# Patient Record
Sex: FEMALE | Race: ASIAN | NOT HISPANIC OR LATINO
[De-identification: names, ages, dates, MRNs, and addresses within clinical notes are randomized per-mention and may not be internally consistent; named-entity substitution may affect disease eponyms.]

---

## 2018-01-08 ENCOUNTER — APPOINTMENT (OUTPATIENT)
Dept: ENDOCRINOLOGY | Facility: CLINIC | Age: 73
End: 2018-01-08

## 2020-01-06 DIAGNOSIS — Z87.442 PERSONAL HISTORY OF URINARY CALCULI: ICD-10-CM

## 2020-01-21 ENCOUNTER — FORM ENCOUNTER (OUTPATIENT)
Age: 75
End: 2020-01-21

## 2020-01-22 ENCOUNTER — OUTPATIENT (OUTPATIENT)
Dept: OUTPATIENT SERVICES | Facility: HOSPITAL | Age: 75
LOS: 1 days | End: 2020-01-22

## 2020-01-22 ENCOUNTER — APPOINTMENT (OUTPATIENT)
Dept: ULTRASOUND IMAGING | Facility: CLINIC | Age: 75
End: 2020-01-22
Payer: MEDICARE

## 2020-01-22 DIAGNOSIS — Z98.89 OTHER SPECIFIED POSTPROCEDURAL STATES: Chronic | ICD-10-CM

## 2020-01-22 DIAGNOSIS — Z41.9 ENCOUNTER FOR PROCEDURE FOR PURPOSES OTHER THAN REMEDYING HEALTH STATE, UNSPECIFIED: Chronic | ICD-10-CM

## 2020-01-22 PROCEDURE — 76770 US EXAM ABDO BACK WALL COMP: CPT | Mod: 26

## 2021-04-27 ENCOUNTER — APPOINTMENT (OUTPATIENT)
Dept: UROLOGY | Facility: CLINIC | Age: 76
End: 2021-04-27

## 2024-04-23 ENCOUNTER — APPOINTMENT (OUTPATIENT)
Dept: UROLOGY | Facility: CLINIC | Age: 79
End: 2024-04-23
Payer: MEDICARE

## 2024-04-23 VITALS
TEMPERATURE: 97.5 F | WEIGHT: 146 LBS | OXYGEN SATURATION: 99 % | SYSTOLIC BLOOD PRESSURE: 122 MMHG | HEIGHT: 62 IN | HEART RATE: 67 BPM | BODY MASS INDEX: 26.87 KG/M2 | DIASTOLIC BLOOD PRESSURE: 80 MMHG

## 2024-04-23 DIAGNOSIS — R31.29 OTHER MICROSCOPIC HEMATURIA: ICD-10-CM

## 2024-04-23 PROCEDURE — 99204 OFFICE O/P NEW MOD 45 MIN: CPT

## 2024-04-24 DIAGNOSIS — N39.0 URINARY TRACT INFECTION, SITE NOT SPECIFIED: ICD-10-CM

## 2024-04-24 LAB
APPEARANCE: CLEAR
BACTERIA: NEGATIVE /HPF
BILIRUBIN URINE: NEGATIVE
BLOOD URINE: NEGATIVE
CAST: 1 /LPF
COLOR: YELLOW
EPITHELIAL CELLS: 2 /HPF
GLUCOSE QUALITATIVE U: NEGATIVE MG/DL
HYALINE CASTS: PRESENT
KETONES URINE: NEGATIVE MG/DL
LEUKOCYTE ESTERASE URINE: ABNORMAL
MICROSCOPIC-UA: NORMAL
MUCUS: PRESENT
NITRITE URINE: NEGATIVE
PH URINE: 5.5
PROTEIN URINE: NORMAL MG/DL
RED BLOOD CELLS URINE: 6 /HPF
SPECIFIC GRAVITY URINE: 1.02
UROBILINOGEN URINE: 1 MG/DL
WHITE BLOOD CELLS URINE: 3 /HPF

## 2024-04-24 RX ORDER — NITROFURANTOIN (MONOHYDRATE/MACROCRYSTALS) 25; 75 MG/1; MG/1
100 CAPSULE ORAL
Qty: 10 | Refills: 0 | Status: ACTIVE | COMMUNITY
Start: 2024-04-24 | End: 1900-01-01

## 2024-04-24 RX ORDER — ESTRADIOL 0.1 MG/G
0.1 CREAM VAGINAL
Qty: 1 | Refills: 11 | Status: ACTIVE | COMMUNITY
Start: 2024-04-24 | End: 1900-01-01

## 2024-04-24 NOTE — HISTORY OF PRESENT ILLNESS
[FreeTextEntry1] : 2024  - Pt is a 79 year yo F  former pt of mine whom I have not seen in >3 years.  She has a history of kidney stones and presents with UTI-like symptoms including frequency, dysuria and incontinence that started 3 weeks ago. She denies fever and chills.   She reports having 3-4 UTIs within the past year that resolve with antibiotics; she reports Macrobid working for her in the past. She has a history of kidney stones 3mm and 1mm on the left side; 3mm and 3mm on the right side all nonobstructive and treated conservatively.  She has not had any recent imaging.   She has used Estradiol in the past with adequate results, but stopped for unclear reasons.    udip- negative   PVR: 51cc (done to rule out incomplete bladder emptying)   PMH: hypertension, diabetes PSH: N/A FH: hypertension, diabeties SocH: social EtOH Allergies: NKDA Meds: amlodipine, atorvastatin, metformin

## 2024-04-24 NOTE — ASSESSMENT
[FreeTextEntry1] : I discussed the findings and options with Ms. DARLENE MATOS in detail.  1. I reviewed the generic recommendations for UTI prevention with Ms. DARLENE MATOS in detail, including: wiping front to back, increasing fluid intake. She will incorporate these and understands that she should have a urine culture prior to being treated with any courses of antibiotics.  3. In view of her being symptomatic for 3 weeks we will start empiric Macrobid and change as necessary from urine culture. - If her symptoms do not resolve or she has a negative culture, we will discuss further evaluation with cystoscopy at the time of her next visit.  4.. We discussed the benefits of using Estradiol to decrease UTIs -We will start her on Estrace nightly then decrease to 2-3 times per week.   5. In view of her history of kidney stones, she will be scheduled for a renal ultrasound.   Patient expressed understanding.   The total amount of time I have personally spent preparing for this visit, reviewing the patient's test results, obtaining external history, ordering tests/medications, documenting clinical information, communicating with and counseling the patient/family and/or caregiver(s), reviewing old records, and spent face to face with the patient explaining the above was 45 minutes.

## 2024-04-25 LAB — BACTERIA UR CULT: NORMAL

## 2024-05-08 ENCOUNTER — APPOINTMENT (OUTPATIENT)
Dept: UROLOGY | Facility: CLINIC | Age: 79
End: 2024-05-08
Payer: MEDICARE

## 2024-05-08 LAB
BILIRUB UR QL STRIP: NORMAL
CLARITY UR: CLEAR
COLLECTION METHOD: NORMAL
GLUCOSE UR-MCNC: NORMAL
HCG UR QL: 0.2 EU/DL
HGB UR QL STRIP.AUTO: NORMAL
KETONES UR-MCNC: NORMAL
LEUKOCYTE ESTERASE UR QL STRIP: NORMAL
NITRITE UR QL STRIP: NORMAL
PH UR STRIP: 6.5
PROT UR STRIP-MCNC: NORMAL
SP GR UR STRIP: 1.02

## 2024-05-08 PROCEDURE — 81003 URINALYSIS AUTO W/O SCOPE: CPT | Mod: QW

## 2024-05-08 PROCEDURE — 99214 OFFICE O/P EST MOD 30 MIN: CPT | Mod: 25

## 2024-05-10 LAB — BACTERIA UR CULT: NORMAL

## 2024-05-13 NOTE — ADDENDUM
[FreeTextEntry1] : A portion of this note was written by [Fermín Whitlock] on 05/08/2024 acting as a scribe for Dr. Ashton.   I have personally reviewed the chart and agree that the record accurately reflects my personal performance of the history, physical exam, assessment, and plan.

## 2024-05-13 NOTE — HISTORY OF PRESENT ILLNESS
[FreeTextEntry1] : 2024 --  Pt is a 79 year yo F  former pt of mine whom I have not seen in >3 years. She has a history of kidney stones and presents with UTI-like symptoms including frequency, dysuria and incontinence that started 3 weeks ago. She denies fever and chills. Here today for f/u- discuss RBUS (see below).   RBUS 24-- No renal stones or hydronephrosis. Simple R renal cyst noted. Poorly distended bladder and incomplete emptying noted.   UC: 24-- negative  UA: 24-- 6 RBC     2024  - Pt is a 79 year yo F  former pt of mine whom I have not seen in >3 years.  She has a history of kidney stones and presents with UTI-like symptoms including frequency, dysuria and incontinence that started 3 weeks ago. She denies fever and chills.   She reports having 3-4 UTIs within the past year that resolve with antibiotics; she reports Macrobid working for her in the past. She has a history of kidney stones 3mm and 1mm on the left side; 3mm and 3mm on the right side all nonobstructive and treated conservatively.  She has not had any recent imaging.   She has used Estradiol in the past with adequate results, but stopped for unclear reasons.    udip- negative   PVR: 51cc (done to rule out incomplete bladder emptying)   PMH: hypertension, diabetes PSH: N/A FH: hypertension, diabeties SocH: social EtOH Allergies: NKDA Meds: amlodipine, atorvastatin, metformin

## 2024-05-13 NOTE — ASSESSMENT
[FreeTextEntry1] : I discussed the findings and options with Ms. DARLENE MATOS in detail.  1. Reviewed RBUS 5/1/24 with patient-- unremarkable, no evidence of renal stones or hydronephrosis. Simple R renal cyst noted. Poorly distended bladder and incomplete emptying noted.  2. Recent UA 4/23/24 showed 6 RBC on review.  We reviewed the standard evaluation for microscopic hematuria. She understands that if UAx revealed microhematuria (>3RBC), she will need additional testing (i.e., imaging of the kidneys, followed by an in-office cystoscopy).  I have described both procedures in detail.  Urine was sent for culture to r/o infection.   Pt will plan to return in office for cystoscopy. Patient expressed understanding.    The total amount of time I have personally spent preparing for this visit, reviewing the patient's test results, obtaining external history, ordering tests/medications, documenting clinical information, communicating with and counseling the patient/family and/or caregiver(s), reviewing old records, and spent face to face with the patient explaining the above was 35 minutes.

## 2024-05-23 ENCOUNTER — APPOINTMENT (OUTPATIENT)
Dept: UROLOGY | Facility: CLINIC | Age: 79
End: 2024-05-23
Payer: MEDICARE

## 2024-05-23 PROCEDURE — 52000 CYSTOURETHROSCOPY: CPT

## 2024-05-23 NOTE — HISTORY OF PRESENT ILLNESS
[FreeTextEntry1] : 2024 -- 79 F  hx kidney stones, UTIs, microhematuria, urinary frequency and urgency. RBUS 24-- No renal stones or hydronephrosis. Recent UA 24-- 6 RBC. Here today for cystoscopy.    2024 --  Pt is a 79 year yo F  former pt of mine whom I have not seen in >3 years. She has a history of kidney stones and presents with UTI-like symptoms including frequency, dysuria and incontinence that started 3 weeks ago. She denies fever and chills. Here today for f/u- discuss RBUS (see below).   RBUS 24-- No renal stones or hydronephrosis. Simple R renal cyst noted. Poorly distended bladder and incomplete emptying noted.   UC: 24-- negative  UA: 24-- 6 RBC     2024  - Pt is a 79 year yo F  former pt of mine whom I have not seen in >3 years.  She has a history of kidney stones and presents with UTI-like symptoms including frequency, dysuria and incontinence that started 3 weeks ago. She denies fever and chills.   She reports having 3-4 UTIs within the past year that resolve with antibiotics; she reports Macrobid working for her in the past. She has a history of kidney stones 3mm and 1mm on the left side; 3mm and 3mm on the right side all nonobstructive and treated conservatively.  She has not had any recent imaging.   She has used Estradiol in the past with adequate results, but stopped for unclear reasons.    udip- negative   PVR: 51cc (done to rule out incomplete bladder emptying)   PMH: hypertension, diabetes PSH: N/A FH: hypertension, diabeties SocH: social EtOH Allergies: NKDA Meds: amlodipine, atorvastatin, metformin

## 2024-05-23 NOTE — ASSESSMENT
[FreeTextEntry1] : I discussed the findings and options with Ms. DARLENE MATOS in detail.  Ms. DARLENE MATOS comes in today for her cystoscopy procedure as discussed during last visit.  Fortunately, today's cystoscopy was normal and negative for any significant lower urinary tract pathology.   Patient reports 1-2 episodes of urge incontinence per week.  - She will start Gemtesa trial if cleared by cardiology (Dr. Morales).  Urine was sent for culture to r/o infection.   All of her questions were answered. Patient expressed understanding.

## 2024-05-28 LAB — BACTERIA UR CULT: NORMAL

## 2024-07-29 ENCOUNTER — APPOINTMENT (OUTPATIENT)
Dept: UROLOGY | Facility: CLINIC | Age: 79
End: 2024-07-29
Payer: MEDICARE

## 2024-07-29 VITALS
SYSTOLIC BLOOD PRESSURE: 125 MMHG | TEMPERATURE: 97.6 F | HEART RATE: 79 BPM | DIASTOLIC BLOOD PRESSURE: 80 MMHG | OXYGEN SATURATION: 97 %

## 2024-07-29 PROCEDURE — 99214 OFFICE O/P EST MOD 30 MIN: CPT

## 2024-08-01 NOTE — HISTORY OF PRESENT ILLNESS
[FreeTextEntry1] : 2024 -- 79 F  hx kidney stones, UTIs, microhematuria, urinary frequency and urgency. RBUS 24-- No renal stones or hydronephrosis. Normal cystoscopy 24.    Reports that she has not taken Gemtesa long enough to see improvement (her cardiologist is in favor of Gemtesa).  She has not had any recent UTIs. Denies gross hematuria or dysuria.    2024 -- 79 F  hx kidney stones, UTIs, microhematuria, urinary frequency and urgency. RBUS 24-- No renal stones or hydronephrosis. Recent UA 24-- 6 RBC. Here today for cystoscopy.    2024 --  Pt is a 79 year yo F  former pt of mine whom I have not seen in >3 years. She has a history of kidney stones and presents with UTI-like symptoms including frequency, dysuria and incontinence that started 3 weeks ago. She denies fever and chills. Here today for f/u- discuss RBUS (see below).   RBUS 24-- No renal stones or hydronephrosis. Simple R renal cyst noted. Poorly distended bladder and incomplete emptying noted.   UC: 24-- negative  UA: 24-- 6 RBC     2024  - Pt is a 79 year yo F  former pt of mine whom I have not seen in >3 years.  She has a history of kidney stones and presents with UTI-like symptoms including frequency, dysuria and incontinence that started 3 weeks ago. She denies fever and chills.   She reports having 3-4 UTIs within the past year that resolve with antibiotics; she reports Macrobid working for her in the past. She has a history of kidney stones 3mm and 1mm on the left side; 3mm and 3mm on the right side all nonobstructive and treated conservatively.  She has not had any recent imaging.   She has used Estradiol in the past with adequate results, but stopped for unclear reasons.    udip- negative   PVR: 51cc (done to rule out incomplete bladder emptying)   PMH: hypertension, diabetes PSH: N/A FH: hypertension, diabeties SocH: social EtOH Allergies: NKDA Meds: amlodipine, atorvastatin, metformin

## 2024-08-01 NOTE — ADDENDUM
[FreeTextEntry1] : A portion of this note was written by [Fermín Whitlock] on 07/29/2024 acting as a scribe for Dr. Ashton.   I have personally reviewed the chart and agree that the record accurately reflects my personal performance of the history, physical exam, assessment, and plan.

## 2024-08-01 NOTE — ASSESSMENT
[FreeTextEntry1] : I discussed the findings and options with Ms. DARLENE MATOS in detail.  - Continue Gemtesa to manage her OAB (cleared by cardiology) - Repeat urine studies in 1 year.   Return in 3-4 weeks to assess her progress with Gemtesa. Patient expressed understanding.   30 min spent on this encounter.

## 2024-10-02 ENCOUNTER — APPOINTMENT (OUTPATIENT)
Dept: UROLOGY | Facility: CLINIC | Age: 79
End: 2024-10-02
Payer: MEDICARE

## 2024-10-02 VITALS
OXYGEN SATURATION: 97 % | TEMPERATURE: 97.8 F | HEART RATE: 59 BPM | DIASTOLIC BLOOD PRESSURE: 71 MMHG | SYSTOLIC BLOOD PRESSURE: 128 MMHG

## 2024-10-02 DIAGNOSIS — N32.81 OVERACTIVE BLADDER: ICD-10-CM

## 2024-10-02 LAB
BILIRUB UR QL STRIP: NORMAL
CLARITY UR: CLEAR
COLLECTION METHOD: NORMAL
GLUCOSE UR-MCNC: NORMAL
HCG UR QL: 0.2 EU/DL
HGB UR QL STRIP.AUTO: NORMAL
KETONES UR-MCNC: NORMAL
LEUKOCYTE ESTERASE UR QL STRIP: NORMAL
NITRITE UR QL STRIP: NORMAL
PH UR STRIP: 5.5
PROT UR STRIP-MCNC: NORMAL
SP GR UR STRIP: 1.02

## 2024-10-02 PROCEDURE — 81003 URINALYSIS AUTO W/O SCOPE: CPT | Mod: QW

## 2024-10-02 PROCEDURE — 99213 OFFICE O/P EST LOW 20 MIN: CPT | Mod: 25

## 2024-10-02 RX ORDER — VIBEGRON 75 MG/1
75 TABLET, FILM COATED ORAL
Qty: 90 | Refills: 3 | Status: ACTIVE | COMMUNITY
Start: 2024-10-02 | End: 1900-01-01

## 2024-10-02 NOTE — ADDENDUM
[FreeTextEntry1] : A portion of this note was written by [Fermín Whitlock] on 10/02/2024 acting as a scribe for Dr. Ashton.   I have personally reviewed the chart and agree that the record accurately reflects my personal performance of the history, physical exam, assessment, and plan.

## 2024-10-02 NOTE — ASSESSMENT
[FreeTextEntry1] : I discussed the findings and options with Ms. DARLENE MATOS in detail.   Ms. MATOS reports to be well managed with gemtesa with no side effects. She has no new urinary complaints.  We agreed that she will continue gemtesa to manage her OAB. Pt will reach out to the office if the medication is cost prohibitive.  Pt will plan to return in 1 year for annual f/u, or sooner if needed. Patient expressed understanding.

## 2024-10-02 NOTE — HISTORY OF PRESENT ILLNESS
[FreeTextEntry1] : 10/02/2024 --  79 F  hx kidney stones, UTIs, microhematuria, urinary frequency and urgency.  Ms. MATOS reports to be well managed with Gemtesa with no side effects. She has no new urinary complaints.   2024 -- 79 F  hx kidney stones, UTIs, microhematuria, urinary frequency and urgency. RBUS 24-- No renal stones or hydronephrosis. Normal cystoscopy 24.    Reports that she has not taken Gemtesa long enough to see improvement (her cardiologist is in favor of Gemtesa).  She has not had any recent UTIs. Denies gross hematuria or dysuria.    2024 -- 79 F  hx kidney stones, UTIs, microhematuria, urinary frequency and urgency. RBUS 24-- No renal stones or hydronephrosis. Recent UA 24-- 6 RBC. Here today for cystoscopy.    2024 --  Pt is a 79 year yo F  former pt of mine whom I have not seen in >3 years. She has a history of kidney stones and presents with UTI-like symptoms including frequency, dysuria and incontinence that started 3 weeks ago. She denies fever and chills. Here today for f/u- discuss RBUS (see below).   RBUS 24-- No renal stones or hydronephrosis. Simple R renal cyst noted. Poorly distended bladder and incomplete emptying noted.   UC: 24-- negative  UA: 24-- 6 RBC     2024  - Pt is a 79 year yo F  former pt of mine whom I have not seen in >3 years.  She has a history of kidney stones and presents with UTI-like symptoms including frequency, dysuria and incontinence that started 3 weeks ago. She denies fever and chills.   She reports having 3-4 UTIs within the past year that resolve with antibiotics; she reports Macrobid working for her in the past. She has a history of kidney stones 3mm and 1mm on the left side; 3mm and 3mm on the right side all nonobstructive and treated conservatively.  She has not had any recent imaging.   She has used Estradiol in the past with adequate results, but stopped for unclear reasons.    udip- negative   PVR: 51cc (done to rule out incomplete bladder emptying)   PMH: hypertension, diabetes PSH: N/A FH: hypertension, diabeties SocH: social EtOH Allergies: NKDA Meds: amlodipine, atorvastatin, metformin

## 2024-10-29 NOTE — ADDENDUM
[FreeTextEntry1] : A portion of this note was written by [Fermín Whitlock] on 05/23/2024 acting as a scribe for Dr. Ashton.   I have personally reviewed the chart and agree that the record accurately reflects my personal performance of the history, physical exam, assessment, and plan.  Statement Selected

## 2025-06-13 ENCOUNTER — NON-APPOINTMENT (OUTPATIENT)
Age: 80
End: 2025-06-13

## 2025-06-13 RX ORDER — CEFPODOXIME PROXETIL 100 MG/1
100 TABLET, FILM COATED ORAL
Qty: 10 | Refills: 0 | Status: ACTIVE | COMMUNITY
Start: 2025-06-13 | End: 1900-01-01

## 2025-06-16 LAB — BACTERIA UR CULT: ABNORMAL
